# Patient Record
(demographics unavailable — no encounter records)

---

## 2024-11-26 NOTE — PHYSICAL EXAM
[de-identified] : left EAC with resolved edema and erythema, mild flaky debris without purulence.  granulation tiss seen lateral to superior TM  Right EAC clear [de-identified] : TM intact without obvious middle ear effusion bilaterally.  [Midline] : trachea located in midline position [Normal] : cranial nerves 2-12 intact [de-identified] : CN 2-12 grossly intact bilaterally.

## 2024-11-26 NOTE — REASON FOR VISIT
[Subsequent Evaluation] : a subsequent evaluation for [FreeTextEntry2] :  left ear pain, ear infection.

## 2024-11-26 NOTE — HISTORY OF PRESENT ILLNESS
[FreeTextEntry1] :  Patient presents today c/o left OE / AOM and skull base osteomyelitis with pmhx of DM. He took bactrim and antibipitic ear drop as prescribed and symptoms worsened prompting [facility] hospital admission. 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 Broad spectrum IV antibiotics initiated and Repeat imaging revealed improvement in infection.  ID recommended long term PO Abx. He is now feeling better but feels as if he cannot hear still. No further complaints. He has finised his PO course of abx and has ID follow up in 1 month.

## 2024-11-26 NOTE — ASSESSMENT
[FreeTextEntry1] : George is a pleasant 60 yo male with DM, left malignant otitis externa, AOM, and skull base osteomylitis.  Doing much better overall.  Start ciprodex , 5 drops BID to left ear for granulation tissue - 2 weeks.  I will confirm with ID his PO antibiotic course as he endorses he is finished.   Keep follow up with ID as scheduled.    Total time spent on patient encounter including review of patient's medical history, physical examination, interpretation of any indicated labs / imaging and counseling, excluding time spent performing any indicated procedures: 30-44 minutes.  Ramírez Law MD, MPH Director of Pediatric Otolaryngology [facility] / [facility]
[FreeTextEntry1] : George is a pleasant 62 yo male with DM, left malignant otitis externa, AOM, and skull base osteomylitis.  Doing much better overall.  Start ciprodex , 5 drops BID to left ear for granulation tissue - 2 weeks.  I will confirm with ID his PO antibiotic course as he endorses he is finished.   Keep follow up with ID as scheduled.    Total time spent on patient encounter including review of patient's medical history, physical examination, interpretation of any indicated labs / imaging and counseling, excluding time spent performing any indicated procedures: 30-44 minutes.  Ramírez Law MD, MPH Director of Pediatric Otolaryngology [facility] / [facility]
5 months

## 2024-11-26 NOTE — PHYSICAL EXAM
[de-identified] : left EAC with resolved edema and erythema, mild flaky debris without purulence.  granulation tiss seen lateral to superior TM  Right EAC clear [de-identified] : TM intact without obvious middle ear effusion bilaterally.  [Midline] : trachea located in midline position [Normal] : cranial nerves 2-12 intact [de-identified] : CN 2-12 grossly intact bilaterally.

## 2024-11-26 NOTE — HISTORY OF PRESENT ILLNESS
[FreeTextEntry1] :  Patient presents today c/o left OE / AOM and skull base osteomyelitis with pmhx of DM. He took bactrim and antibipitic ear drop as prescribed and symptoms worsened prompting [facility] hospital admission. 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 Broad spectrum IV antibiotics initiated and Repeat imaging revealed improvement in infection.  ID recommended long term PO Abx. He is now feeling better but feels as if he cannot hear still. No further complaints. He has finised his PO course of abx and has ID follow up in 1 month.

## 2025-01-18 NOTE — ASSESSMENT
[FreeTextEntry1] : George is a pleasant 62 yo male with DM, and resolved left malignant otitis externa, AOM, and skull base osteomylitis.  Doing much better overall.  ID course finished.   Will consider repeat bone scan - however he has clinically resolved, utility is likely low.   FOllow up in 3-6 months.      Total time spent on patient encounter including review of patient's medical history, physical examination, interpretation of any indicated labs / imaging and counseling, excluding time spent performing any indicated procedures: 37 minutes.  Ramírez Law MD, MPH Director of Pediatric Otolaryngology [facility] / [facility]

## 2025-01-18 NOTE — PHYSICAL EXAM
[de-identified] : Bilateral EAC clear and without signs of infection [de-identified] : TM intact without obvious middle ear effusion bilaterally.  [Midline] : trachea located in midline position [Normal] : cranial nerves 2-12 intact [de-identified] : CN 2-12 grossly intact bilaterally.

## 2025-01-18 NOTE — ASSESSMENT
[FreeTextEntry1] : George is a pleasant 62 yo male with DM, and resolved left malignant otitis externa, AOM, and skull base osteomylitis.  Doing much better overall.  ID course finished.   Will consider repeat bone scan - however he has clinically resolved, utility is likely low.   FOllow up in 3-6 months.      Total time spent on patient encounter including review of patient's medical history, physical examination, interpretation of any indicated labs / imaging and counseling, excluding time spent performing any indicated procedures: 37 minutes.  Ramírez Law MD, MPH Director of Pediatric Otolaryngology [facility] / [facility]

## 2025-01-18 NOTE — PHYSICAL EXAM
[de-identified] : Bilateral EAC clear and without signs of infection [de-identified] : TM intact without obvious middle ear effusion bilaterally.  [Midline] : trachea located in midline position [Normal] : cranial nerves 2-12 intact [de-identified] : CN 2-12 grossly intact bilaterally.

## 2025-01-18 NOTE — REASON FOR VISIT
[Subsequent Evaluation] : a subsequent evaluation for [FreeTextEntry2] : acute otitis externa of left ear , Osteomyelitis of the skull

## 2025-01-18 NOTE — HISTORY OF PRESENT ILLNESS
[FreeTextEntry1] : Patient returns today for AOM/OE  of left ear , Osteomyelitis of the skull .  Patient states he is feeling better and has no complaints .  He did not do the MR IAC and the Xray yet.

## 2025-04-19 NOTE — ASSESSMENT
[FreeTextEntry1] : George is a pleasant 62 yo male with DM, and resolved left malignant otitis externa, AOM, and skull base osteomylitis.  Has complaints of chronic hearing loss.   performed today and reviewed with patient.  Recommend hearing aid evaluation (bilateral). FOllow up in 3-6 months.      Total time spent on patient encounter including review of patient's medical history, physical examination, interpretation of any indicated labs / imaging and counseling, excluding time spent performing any indicated procedures: 37 minutes.  Ramírez Law MD, MPH Director of Pediatric Otolaryngology [facility] / [facility]

## 2025-04-19 NOTE — PHYSICAL EXAM
[Midline] : trachea located in midline position [de-identified] : Bilateral EAC clear and without signs of infection [de-identified] : TM intact without obvious middle ear effusion bilaterally.  [Normal] : palpation of lymph nodes is normal

## 2025-04-19 NOTE — REASON FOR VISIT
[Subsequent Evaluation] : a subsequent evaluation for [FreeTextEntry2] : acute otitis externa of left ear , Osteomyelitis of the skull.

## 2025-04-19 NOTE — ASSESSMENT
[FreeTextEntry1] : George is a pleasant 60 yo male with DM, and resolved left malignant otitis externa, AOM, and skull base osteomylitis.  Has complaints of chronic hearing loss.   performed today and reviewed with patient.  Recommend hearing aid evaluation (bilateral). FOllow up in 3-6 months.      Total time spent on patient encounter including review of patient's medical history, physical examination, interpretation of any indicated labs / imaging and counseling, excluding time spent performing any indicated procedures: 37 minutes.  Ramírez Law MD, MPH Director of Pediatric Otolaryngology [facility] / [facility]

## 2025-04-19 NOTE — HISTORY OF PRESENT ILLNESS
[FreeTextEntry1] : [place] ID  [id number] Patient following up today for acute otitis externa of left ear, Osteomyelitis of the skull. Patient states his ears sometimes still get fluid in them and has trouble hearing.  Denies any pain.

## 2025-04-19 NOTE — HISTORY OF PRESENT ILLNESS
[FreeTextEntry1] : [place] ID  [id number] Patient following up today for acute otitis externa of left ear, Osteomyelitis of the skull. Patient states his ears sometimes still get fluid in them and has trouble hearing.  Denies any pain.

## 2025-04-19 NOTE — PHYSICAL EXAM
[Midline] : trachea located in midline position [de-identified] : Bilateral EAC clear and without signs of infection [de-identified] : TM intact without obvious middle ear effusion bilaterally.  [Normal] : palpation of lymph nodes is normal

## 2025-05-14 NOTE — DISCUSSION/SUMMARY
[de-identified] : Chief complaint: Right knee pain  HPI: Patient is a 61-year-old male with a past medical history significant for diabetes who presents to the office today for the evaluation of right knee pain which manifested 1 week ago without any known fall, injury, or trauma.  Patient reports that he was ambulating when he twisted the right knee.  He immediately experienced pain.  Since that time he has had ongoing constant pain to the right knee worse with ambulation and weightbearing.  No reported previous surgical intervention to the right knee.  He has been ambulating with a cane secondary to pain.  He normally ambulates without assistive device.  Has not taken any medication for the relief of his discomfort.  ROS: Positive for right knee pain  Physical examination the right knee:  No appreciable edema No erythema No ecchymosis Skin is intact Patient is able to perform active straight leg raise Active range of motion from 0 to 110 degrees Tenderness to palpation over medial joint line Positive Mikey's medially Stable to valgus and varus stress testing No appreciable tibial translation with anterior/posterior drawer Right calf soft and nontender  Three-view x-rays of the right knee performed in the office today show no obvious acute displaced fracture, subluxation, or dislocation  Assessment/plan: Acute pain of the right knee, discussed treatment options with the patient  1.  A prescription for ibuprofen 800 mg as needed twice daily with food was sent to the patient's pharmacy, confirmed no contraindications to NSAIDS. Patient denies being on a blood thinner. Denies history of GIB or GI ulcer.  Discussed in detail with the patient that they cannot take over-the-counter NSAIDs including but not limited to ibuprofen, Advil, Aleve, or Motrin while taking this medication.  They can continue to take over-the-counter Tylenol. 2.  Recommend formal physical therapy for the right knee, the patient was amenable to this, prescription was provided, I did advise the patient to get a home exercise program at the direction/discretion of the physical therapist so that exercises can be performed at home while not in formal therapy 3.  Ice or heat can be applied to the affected area on an as-needed basis with diabetic sensory precautions  Patient will be provided with a 6-week follow-up with me for repeat evaluation, at that time if there is no significant improvement in the patient's pain/discomfort with formal physical therapy and prescription NSAIDs can consider corticosteroid injection if the patient's diabetes is well-controlled versus MRI of the right knee without contrast to rule out medial meniscal tear  Patient verbalized understanding of all findings in the office today, agrees to follow-up as directed

## 2025-06-26 NOTE — DISCUSSION/SUMMARY
[de-identified] : Chief complaint: Follow-up on right knee pain  HPI: Patient is a 61-year-old male presents the office today for the repeat evaluation of right knee pain.  Patient was prescribed physical therapy at his last visit.  He has done 3 visits of physical therapy.  At this time he reports seeing approximately 75% improvement in his knee pain.  He occasionally gets pain with certain range of motion activities.  Denies any new fall, injury, or trauma.  ROS: Positive for right knee pain  Physical examination right knee:  No appreciable edema No erythema No ecchymosis Skin is intact Patient is able to perform active straight leg raise Active range of motion from 0 to 110 degrees No appreciable tenderness to palpation Stable to valgus motor stress testing Negative Mikey's No appreciable tibial translation with anterior/posterior drawer Right calf is soft and nontender  Assessment/plan: Acute pain of the right knee, discussed ongoing treatment options  1.  Given that the patient has had significant improvement with physical therapy I recommend that the patient continue with physical therapy at this time, recommend getting home exercises at the discretion/direction of his physical therapist to perform exercises at home, patient reports that he has approximately 17 visits of physical therapy remaining on his current prescription 2.  Patient is unsure of his hemoglobin A1c, discussed with the patient that at his next visit if his hemoglobin A1c is controlled as well as his daily blood glucose we can consider corticosteroid injection to the right knee 3.  Patient can take over-the-counter Tylenol on an as needed basis for pain/discomfort 4.  Discussed activity modifications with the patient  Patient will be provided with a 2-3-month follow-up with me for repeat evaluation, the patient verbalized understanding of all findings in the office today, agrees to follow-up as directed